# Patient Record
Sex: FEMALE | Race: BLACK OR AFRICAN AMERICAN
[De-identification: names, ages, dates, MRNs, and addresses within clinical notes are randomized per-mention and may not be internally consistent; named-entity substitution may affect disease eponyms.]

---

## 2019-03-15 ENCOUNTER — HOSPITAL ENCOUNTER (EMERGENCY)
Dept: HOSPITAL 4 - SED | Age: 49
Discharge: HOME | End: 2019-03-15
Payer: MEDICAID

## 2019-03-15 VITALS — SYSTOLIC BLOOD PRESSURE: 137 MMHG

## 2019-03-15 VITALS — BODY MASS INDEX: 51.21 KG/M2 | HEIGHT: 63 IN | WEIGHT: 289 LBS

## 2019-03-15 VITALS — SYSTOLIC BLOOD PRESSURE: 144 MMHG

## 2019-03-15 DIAGNOSIS — R03.0: ICD-10-CM

## 2019-03-15 DIAGNOSIS — Z88.2: ICD-10-CM

## 2019-03-15 DIAGNOSIS — Z88.8: ICD-10-CM

## 2019-03-15 DIAGNOSIS — G43.909: Primary | ICD-10-CM

## 2019-03-15 PROCEDURE — 96372 THER/PROPH/DIAG INJ SC/IM: CPT

## 2019-03-15 PROCEDURE — 99283 EMERGENCY DEPT VISIT LOW MDM: CPT

## 2020-01-09 ENCOUNTER — HOSPITAL ENCOUNTER (EMERGENCY)
Dept: HOSPITAL 4 - SED | Age: 50
Discharge: HOME | End: 2020-01-09
Payer: MEDICAID

## 2020-01-09 VITALS — SYSTOLIC BLOOD PRESSURE: 147 MMHG

## 2020-01-09 VITALS — HEIGHT: 64 IN | BODY MASS INDEX: 48.65 KG/M2 | WEIGHT: 285 LBS

## 2020-01-09 VITALS — SYSTOLIC BLOOD PRESSURE: 137 MMHG

## 2020-01-09 DIAGNOSIS — J06.9: Primary | ICD-10-CM

## 2020-01-09 DIAGNOSIS — Z88.8: ICD-10-CM

## 2020-01-09 NOTE — NUR
Patient given written and verbal discharge instructions and verbalizes 
understanding.  ER MD discussed with patient the results and treatment 
provided. Patient in stable condition. ID arm band removed.

Rx of tessalon perles given. Patient educated on pain management and to follow 
up with PMD. Pain Scale 3/10.

Opportunity for questions provided and answered. Medication side effect fact 
sheet provided.